# Patient Record
Sex: FEMALE | Race: WHITE
[De-identification: names, ages, dates, MRNs, and addresses within clinical notes are randomized per-mention and may not be internally consistent; named-entity substitution may affect disease eponyms.]

---

## 2019-07-17 ENCOUNTER — HOSPITAL ENCOUNTER (OUTPATIENT)
Dept: HOSPITAL 92 - TBSIIMAG | Age: 24
Discharge: HOME | End: 2019-07-17
Attending: PEDIATRICS
Payer: COMMERCIAL

## 2019-07-17 DIAGNOSIS — S83.207A: ICD-10-CM

## 2019-07-17 DIAGNOSIS — M25.562: Primary | ICD-10-CM

## 2019-07-17 DIAGNOSIS — M71.22: ICD-10-CM

## 2019-07-17 DIAGNOSIS — M25.462: ICD-10-CM

## 2019-07-17 NOTE — RAD
LEFT KNEE THREE VIEWS:

 

HISTORY:

Injury.  Left knee pain.  Lateral joint line tenderness of the left knee.

 

FINDINGS:

No fracture, dislocation, or bony destruction is seen.

 

POS: [facility]

## 2019-07-17 NOTE — MRI
MR OF THE LEFT KNEE WITHOUT CONTRAST



INDICATION: Left knee pain



TECHNIQUE: Axial and coronal PD fat sat, sagittal T2 fat sat, sagittal PD turbo spin echo and T1 pollo
nal images were obtained of the left knee.



COMPARISON: Left knee radiograph dated July 17, 2019



FINDINGS:



Joint effusion: Mild



Semimembranosus-medial gastrocnemius popliteal cyst: Small



Ligaments: The ACL, PCL, MCL and LCLC are intact.



Extensor mechanism: Intact.



Menisci: Intact.



Articular cartilage: Intact.



Osseous structures: Normal marrow signal.



Popliteus and IT band: Normal.



IMPRESSION:

1. Mild joint effusion and small popliteal cyst.

2. No additional acute abnormality demonstrated.



Reported By: Harish Saini 

Electronically Signed:  7/17/2019 9:35 AM